# Patient Record
Sex: FEMALE | Race: WHITE | NOT HISPANIC OR LATINO | URBAN - METROPOLITAN AREA
[De-identification: names, ages, dates, MRNs, and addresses within clinical notes are randomized per-mention and may not be internally consistent; named-entity substitution may affect disease eponyms.]

---

## 2017-05-22 ENCOUNTER — OUTPATIENT (OUTPATIENT)
Dept: OUTPATIENT SERVICES | Facility: HOSPITAL | Age: 47
LOS: 1 days | End: 2017-05-22
Payer: COMMERCIAL

## 2017-05-22 ENCOUNTER — APPOINTMENT (OUTPATIENT)
Dept: ULTRASOUND IMAGING | Facility: HOSPITAL | Age: 47
End: 2017-05-22

## 2017-05-22 PROCEDURE — 76830 TRANSVAGINAL US NON-OB: CPT | Mod: 26

## 2017-05-22 PROCEDURE — 76856 US EXAM PELVIC COMPLETE: CPT

## 2017-05-22 PROCEDURE — 76830 TRANSVAGINAL US NON-OB: CPT

## 2017-05-22 PROCEDURE — 76856 US EXAM PELVIC COMPLETE: CPT | Mod: 26

## 2017-08-03 ENCOUNTER — APPOINTMENT (OUTPATIENT)
Dept: ULTRASOUND IMAGING | Facility: HOSPITAL | Age: 47
End: 2017-08-03

## 2017-08-03 ENCOUNTER — APPOINTMENT (OUTPATIENT)
Dept: MAMMOGRAPHY | Facility: HOSPITAL | Age: 47
End: 2017-08-03

## 2017-08-15 ENCOUNTER — APPOINTMENT (OUTPATIENT)
Dept: MAMMOGRAPHY | Facility: CLINIC | Age: 47
End: 2017-08-15
Payer: COMMERCIAL

## 2017-08-15 ENCOUNTER — OUTPATIENT (OUTPATIENT)
Dept: OUTPATIENT SERVICES | Facility: HOSPITAL | Age: 47
LOS: 1 days | End: 2017-08-15
Payer: COMMERCIAL

## 2017-08-15 ENCOUNTER — APPOINTMENT (OUTPATIENT)
Dept: ULTRASOUND IMAGING | Facility: CLINIC | Age: 47
End: 2017-08-15
Payer: COMMERCIAL

## 2017-08-15 DIAGNOSIS — Z00.8 ENCOUNTER FOR OTHER GENERAL EXAMINATION: ICD-10-CM

## 2017-08-15 PROCEDURE — 77067 SCR MAMMO BI INCL CAD: CPT

## 2017-08-15 PROCEDURE — G0202: CPT | Mod: 26

## 2017-08-15 PROCEDURE — 76831 ECHO EXAM UTERUS: CPT | Mod: 26

## 2017-08-15 PROCEDURE — 58340 CATHETER FOR HYSTEROGRAPHY: CPT

## 2017-08-15 PROCEDURE — 77063 BREAST TOMOSYNTHESIS BI: CPT | Mod: 26

## 2017-08-15 PROCEDURE — 76641 ULTRASOUND BREAST COMPLETE: CPT | Mod: 26,50

## 2017-08-15 PROCEDURE — 76831 ECHO EXAM UTERUS: CPT

## 2017-08-15 PROCEDURE — 77063 BREAST TOMOSYNTHESIS BI: CPT

## 2017-08-15 PROCEDURE — 76641 ULTRASOUND BREAST COMPLETE: CPT

## 2017-11-03 ENCOUNTER — APPOINTMENT (OUTPATIENT)
Dept: OBGYN | Facility: CLINIC | Age: 47
End: 2017-11-03
Payer: COMMERCIAL

## 2017-11-03 VITALS
HEART RATE: 54 BPM | DIASTOLIC BLOOD PRESSURE: 64 MMHG | HEIGHT: 69 IN | BODY MASS INDEX: 22.22 KG/M2 | WEIGHT: 150 LBS | SYSTOLIC BLOOD PRESSURE: 90 MMHG | OXYGEN SATURATION: 99 %

## 2017-11-03 PROCEDURE — 99203 OFFICE O/P NEW LOW 30 MIN: CPT | Mod: 25

## 2017-11-03 PROCEDURE — 57456 ENDOCERV CURETTAGE W/SCOPE: CPT

## 2017-11-03 RX ORDER — LEVOTHYROXINE SODIUM 175 UG/1
175 TABLET ORAL
Qty: 30 | Refills: 0 | Status: ACTIVE | COMMUNITY
Start: 2017-05-17

## 2017-11-03 RX ORDER — KETOCONAZOLE 20 MG/G
2 CREAM TOPICAL
Qty: 30 | Refills: 0 | Status: DISCONTINUED | COMMUNITY
Start: 2017-07-19 | End: 2017-11-03

## 2017-11-04 LAB — HPV HIGH+LOW RISK DNA PNL CVX: NOT DETECTED

## 2017-11-07 ENCOUNTER — APPOINTMENT (OUTPATIENT)
Dept: OBGYN | Facility: CLINIC | Age: 47
End: 2017-11-07
Payer: COMMERCIAL

## 2017-11-07 PROCEDURE — 99243 OFF/OP CNSLTJ NEW/EST LOW 30: CPT

## 2017-11-09 LAB
CORE LAB BIOPSY: NORMAL
CYTOLOGY CVX/VAG DOC THIN PREP: NORMAL

## 2017-11-13 ENCOUNTER — OTHER (OUTPATIENT)
Age: 47
End: 2017-11-13

## 2017-11-27 ENCOUNTER — OUTPATIENT (OUTPATIENT)
Dept: OUTPATIENT SERVICES | Facility: HOSPITAL | Age: 47
LOS: 1 days | End: 2017-11-27
Payer: COMMERCIAL

## 2017-11-27 PROCEDURE — 80048 BASIC METABOLIC PNL TOTAL CA: CPT

## 2017-11-27 PROCEDURE — 85027 COMPLETE CBC AUTOMATED: CPT

## 2017-11-27 PROCEDURE — G0463: CPT

## 2017-11-27 PROCEDURE — 36415 COLL VENOUS BLD VENIPUNCTURE: CPT

## 2017-11-27 PROCEDURE — 84703 CHORIONIC GONADOTROPIN ASSAY: CPT

## 2017-11-29 DIAGNOSIS — N84.0 POLYP OF CORPUS UTERI: ICD-10-CM

## 2017-11-29 DIAGNOSIS — Z01.818 ENCOUNTER FOR OTHER PREPROCEDURAL EXAMINATION: ICD-10-CM

## 2017-12-04 ENCOUNTER — OUTPATIENT (OUTPATIENT)
Dept: OUTPATIENT SERVICES | Facility: HOSPITAL | Age: 47
LOS: 1 days | End: 2017-12-04
Payer: COMMERCIAL

## 2017-12-04 ENCOUNTER — APPOINTMENT (OUTPATIENT)
Dept: OBGYN | Facility: HOSPITAL | Age: 47
End: 2017-12-04

## 2017-12-04 ENCOUNTER — TRANSCRIPTION ENCOUNTER (OUTPATIENT)
Age: 47
End: 2017-12-04

## 2017-12-04 ENCOUNTER — RESULT REVIEW (OUTPATIENT)
Age: 47
End: 2017-12-04

## 2017-12-04 DIAGNOSIS — N84.0 POLYP OF CORPUS UTERI: ICD-10-CM

## 2017-12-04 PROCEDURE — 58558 HYSTEROSCOPY BIOPSY: CPT

## 2017-12-04 PROCEDURE — 88305 TISSUE EXAM BY PATHOLOGIST: CPT | Mod: 26

## 2017-12-04 PROCEDURE — 88305 TISSUE EXAM BY PATHOLOGIST: CPT

## 2017-12-05 ENCOUNTER — OTHER (OUTPATIENT)
Age: 47
End: 2017-12-05

## 2018-01-31 ENCOUNTER — APPOINTMENT (OUTPATIENT)
Dept: OBGYN | Facility: CLINIC | Age: 48
End: 2018-01-31

## 2018-02-16 ENCOUNTER — APPOINTMENT (OUTPATIENT)
Dept: OBGYN | Facility: CLINIC | Age: 48
End: 2018-02-16
Payer: COMMERCIAL

## 2018-02-16 VITALS
SYSTOLIC BLOOD PRESSURE: 100 MMHG | DIASTOLIC BLOOD PRESSURE: 70 MMHG | BODY MASS INDEX: 22.66 KG/M2 | OXYGEN SATURATION: 98 % | HEIGHT: 69 IN | WEIGHT: 153 LBS | HEART RATE: 78 BPM | RESPIRATION RATE: 15 BRPM

## 2018-02-16 DIAGNOSIS — Z80.3 FAMILY HISTORY OF MALIGNANT NEOPLASM OF BREAST: ICD-10-CM

## 2018-02-16 DIAGNOSIS — N76.0 ACUTE VAGINITIS: ICD-10-CM

## 2018-02-16 PROCEDURE — 99214 OFFICE O/P EST MOD 30 MIN: CPT

## 2018-02-17 LAB
CANDIDA VAG CYTO: NOT DETECTED
G VAGINALIS+PREV SP MTYP VAG QL MICRO: NOT DETECTED
T VAGINALIS VAG QL WET PREP: NOT DETECTED

## 2018-02-20 ENCOUNTER — OTHER (OUTPATIENT)
Age: 48
End: 2018-02-20

## 2018-02-21 ENCOUNTER — OTHER (OUTPATIENT)
Age: 48
End: 2018-02-21

## 2018-02-28 ENCOUNTER — APPOINTMENT (OUTPATIENT)
Dept: ULTRASOUND IMAGING | Facility: HOSPITAL | Age: 48
End: 2018-02-28
Payer: COMMERCIAL

## 2018-02-28 ENCOUNTER — OUTPATIENT (OUTPATIENT)
Dept: OUTPATIENT SERVICES | Facility: HOSPITAL | Age: 48
LOS: 1 days | End: 2018-02-28
Payer: COMMERCIAL

## 2018-02-28 DIAGNOSIS — R92.8 OTHER ABNORMAL AND INCONCLUSIVE FINDINGS ON DIAGNOSTIC IMAGING OF BREAST: ICD-10-CM

## 2018-02-28 PROCEDURE — 76642 ULTRASOUND BREAST LIMITED: CPT | Mod: 26,LT

## 2018-02-28 PROCEDURE — 76642 ULTRASOUND BREAST LIMITED: CPT

## 2018-07-25 ENCOUNTER — APPOINTMENT (OUTPATIENT)
Dept: DERMATOLOGY | Facility: CLINIC | Age: 48
End: 2018-07-25

## 2018-08-29 ENCOUNTER — APPOINTMENT (OUTPATIENT)
Dept: DERMATOLOGY | Facility: CLINIC | Age: 48
End: 2018-08-29
Payer: MEDICAID

## 2018-08-29 VITALS
WEIGHT: 148 LBS | SYSTOLIC BLOOD PRESSURE: 114 MMHG | BODY MASS INDEX: 22.43 KG/M2 | DIASTOLIC BLOOD PRESSURE: 72 MMHG | HEIGHT: 68 IN

## 2018-08-29 DIAGNOSIS — L82.1 OTHER SEBORRHEIC KERATOSIS: ICD-10-CM

## 2018-08-29 DIAGNOSIS — D22.9 MELANOCYTIC NEVI, UNSPECIFIED: ICD-10-CM

## 2018-08-29 PROCEDURE — 99203 OFFICE O/P NEW LOW 30 MIN: CPT

## 2018-09-13 ENCOUNTER — OTHER (OUTPATIENT)
Age: 48
End: 2018-09-13

## 2018-09-26 ENCOUNTER — APPOINTMENT (OUTPATIENT)
Dept: DERMATOLOGY | Facility: CLINIC | Age: 48
End: 2018-09-26

## 2018-10-31 ENCOUNTER — APPOINTMENT (OUTPATIENT)
Dept: MAMMOGRAPHY | Facility: HOSPITAL | Age: 48
End: 2018-10-31
Payer: MEDICAID

## 2018-10-31 ENCOUNTER — OUTPATIENT (OUTPATIENT)
Dept: OUTPATIENT SERVICES | Facility: HOSPITAL | Age: 48
LOS: 1 days | End: 2018-10-31
Payer: MEDICAID

## 2018-10-31 ENCOUNTER — APPOINTMENT (OUTPATIENT)
Dept: ULTRASOUND IMAGING | Facility: HOSPITAL | Age: 48
End: 2018-10-31
Payer: MEDICAID

## 2018-10-31 DIAGNOSIS — Z00.8 ENCOUNTER FOR OTHER GENERAL EXAMINATION: ICD-10-CM

## 2018-10-31 PROCEDURE — 77066 DX MAMMO INCL CAD BI: CPT | Mod: 26

## 2018-10-31 PROCEDURE — G0279: CPT | Mod: 26

## 2018-10-31 PROCEDURE — 77062 BREAST TOMOSYNTHESIS BI: CPT | Mod: 26

## 2018-10-31 PROCEDURE — G0279: CPT

## 2018-10-31 PROCEDURE — 76641 ULTRASOUND BREAST COMPLETE: CPT

## 2018-10-31 PROCEDURE — 76641 ULTRASOUND BREAST COMPLETE: CPT | Mod: 26

## 2018-10-31 PROCEDURE — 77066 DX MAMMO INCL CAD BI: CPT

## 2018-11-05 ENCOUNTER — OTHER (OUTPATIENT)
Age: 48
End: 2018-11-05

## 2018-11-06 ENCOUNTER — OTHER (OUTPATIENT)
Age: 48
End: 2018-11-06

## 2018-11-07 ENCOUNTER — APPOINTMENT (OUTPATIENT)
Dept: DERMATOLOGY | Facility: CLINIC | Age: 48
End: 2018-11-07

## 2018-11-14 ENCOUNTER — RESULT REVIEW (OUTPATIENT)
Age: 48
End: 2018-11-14

## 2018-11-14 ENCOUNTER — OUTPATIENT (OUTPATIENT)
Dept: OUTPATIENT SERVICES | Facility: HOSPITAL | Age: 48
LOS: 1 days | End: 2018-11-14
Payer: MEDICAID

## 2018-11-14 DIAGNOSIS — N63.0 UNSPECIFIED LUMP IN UNSPECIFIED BREAST: ICD-10-CM

## 2018-11-14 DIAGNOSIS — R92.8 OTHER ABNORMAL AND INCONCLUSIVE FINDINGS ON DIAGNOSTIC IMAGING OF BREAST: ICD-10-CM

## 2018-11-14 PROCEDURE — 88305 TISSUE EXAM BY PATHOLOGIST: CPT

## 2018-11-14 PROCEDURE — 19083 BX BREAST 1ST LESION US IMAG: CPT

## 2018-11-14 PROCEDURE — 19000 PUNCTURE ASPIR CYST BREAST: CPT | Mod: RT

## 2018-11-14 PROCEDURE — 19084 BX BREAST ADD LESION US IMAG: CPT | Mod: RT

## 2018-11-14 PROCEDURE — 76942 ECHO GUIDE FOR BIOPSY: CPT | Mod: 26,76

## 2018-11-14 PROCEDURE — 88305 TISSUE EXAM BY PATHOLOGIST: CPT | Mod: 26

## 2018-11-14 PROCEDURE — 19000 PUNCTURE ASPIR CYST BREAST: CPT

## 2018-11-14 PROCEDURE — 88173 CYTOPATH EVAL FNA REPORT: CPT

## 2018-11-14 PROCEDURE — 19083 BX BREAST 1ST LESION US IMAG: CPT | Mod: RT

## 2018-11-14 PROCEDURE — 19084 BX BREAST ADD LESION US IMAG: CPT

## 2018-11-14 PROCEDURE — 88173 CYTOPATH EVAL FNA REPORT: CPT | Mod: 26

## 2018-11-14 PROCEDURE — A4648: CPT

## 2018-11-14 PROCEDURE — 76942 ECHO GUIDE FOR BIOPSY: CPT

## 2018-11-15 LAB
NON-GYNECOLOGICAL CYTOLOGY STUDY: SIGNIFICANT CHANGE UP
NON-GYNECOLOGICAL CYTOLOGY STUDY: SIGNIFICANT CHANGE UP

## 2018-11-16 LAB — SURGICAL PATHOLOGY STUDY: SIGNIFICANT CHANGE UP

## 2018-11-21 ENCOUNTER — RESULT REVIEW (OUTPATIENT)
Age: 48
End: 2018-11-21

## 2018-11-27 ENCOUNTER — RESULT REVIEW (OUTPATIENT)
Age: 48
End: 2018-11-27

## 2019-02-06 ENCOUNTER — APPOINTMENT (OUTPATIENT)
Dept: DERMATOLOGY | Facility: CLINIC | Age: 49
End: 2019-02-06

## 2019-07-03 ENCOUNTER — CHART COPY (OUTPATIENT)
Age: 49
End: 2019-07-03

## 2019-09-19 ENCOUNTER — APPOINTMENT (OUTPATIENT)
Dept: SURGICAL ONCOLOGY | Facility: CLINIC | Age: 49
End: 2019-09-19
Payer: MEDICAID

## 2019-09-19 VITALS
BODY MASS INDEX: 22.13 KG/M2 | OXYGEN SATURATION: 99 % | RESPIRATION RATE: 14 BRPM | HEIGHT: 68 IN | SYSTOLIC BLOOD PRESSURE: 110 MMHG | DIASTOLIC BLOOD PRESSURE: 74 MMHG | HEART RATE: 68 BPM | WEIGHT: 146 LBS

## 2019-09-19 DIAGNOSIS — Z87.19 PERSONAL HISTORY OF OTHER DISEASES OF THE DIGESTIVE SYSTEM: ICD-10-CM

## 2019-09-19 PROCEDURE — 99205 OFFICE O/P NEW HI 60 MIN: CPT

## 2019-09-19 NOTE — CONSULT LETTER
[Consult Letter:] : I had the pleasure of evaluating your patient, [unfilled]. [Please see my note below.] : Please see my note below. [Sincerely,] : Sincerely, [Dear  ___] : Dear  [unfilled], [FreeTextEntry3] : Kevin Valente MD FACS\par  [DrSarah  ___] : Dr. POPE

## 2019-09-19 NOTE — ADDENDUM
[FreeTextEntry1] : I, Gila Rowan, acted solely as a scribe for Dr. Kevin Valente on this date 09/19/2019.\par

## 2019-09-19 NOTE — PHYSICAL EXAM
[Normal] : supple, no neck mass and thyroid not enlarged [Normal Neck Lymph Nodes] : normal neck lymph nodes  [Normal Supraclavicular Lymph Nodes] : normal supraclavicular lymph nodes [Normal Groin Lymph Nodes] : normal groin lymph nodes [Normal Axillary Lymph Nodes] : normal axillary lymph nodes [Normal] : oriented to person, place and time, with appropriate affect [de-identified] : 3mm scab with larger surrounding area of scar dorsal aspect left forearm at site of recent biopsy. Multiple other benign-appearing pigmented lesions head, neck, trunk, and extremities.

## 2019-09-19 NOTE — ASSESSMENT
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 \par

## 2019-09-19 NOTE — HISTORY OF PRESENT ILLNESS
[de-identified] : 50 y/o female presents for initial consultation for melanoma in situ.\par \par She is s/p shave biopsy of left forearm (9/4/19): atypical intraepidermal melanocytic proliferation. Melanocytic proliferation is difficult to interpret, however, there are features that suggest it likely represents an early stage in the evolution of a malignant melanoma in situ.\par \par SHx: Thyroidectomy, thymectomy, uterine polyp removal\par MHx: Metastatic thyroid cancer - recent TG level decreased\par Denies constitutional symptoms. \par Denies fever or chills.

## 2019-09-27 ENCOUNTER — APPOINTMENT (OUTPATIENT)
Dept: OBGYN | Facility: CLINIC | Age: 49
End: 2019-09-27
Payer: MEDICAID

## 2019-09-27 VITALS
OXYGEN SATURATION: 98 % | HEART RATE: 73 BPM | BODY MASS INDEX: 21.98 KG/M2 | HEIGHT: 68 IN | SYSTOLIC BLOOD PRESSURE: 110 MMHG | DIASTOLIC BLOOD PRESSURE: 70 MMHG | WEIGHT: 145 LBS

## 2019-09-27 DIAGNOSIS — R92.2 INCONCLUSIVE MAMMOGRAM: ICD-10-CM

## 2019-09-27 DIAGNOSIS — Z13.820 ENCOUNTER FOR SCREENING FOR OSTEOPOROSIS: ICD-10-CM

## 2019-09-27 DIAGNOSIS — C18.9 MALIGNANT NEOPLASM OF COLON, UNSPECIFIED: ICD-10-CM

## 2019-09-27 PROCEDURE — 99396 PREV VISIT EST AGE 40-64: CPT

## 2019-09-27 RX ORDER — MISOPROSTOL 200 UG/1
200 TABLET ORAL
Qty: 4 | Refills: 0 | Status: DISCONTINUED | COMMUNITY
Start: 2017-11-03 | End: 2019-09-27

## 2019-09-27 RX ORDER — LEVOTHYROXINE SODIUM 137 UG/1
TABLET ORAL
Refills: 0 | Status: DISCONTINUED | COMMUNITY
End: 2019-09-27

## 2019-09-27 NOTE — PHYSICAL EXAM
[Awake] : awake [Alert] : alert [Acute Distress] : no acute distress [Mass] : no breast mass [Nipple Discharge] : no nipple discharge [Axillary LAD] : no axillary lymphadenopathy [Soft] : soft [Oriented x3] : oriented to person, place, and time [Tender] : non tender [Normal] : uterus [No Bleeding] : there was no active vaginal bleeding [FreeTextEntry9] : rt buttock lesion --derm monitors [Uterine Adnexae] : were not tender and not enlarged

## 2019-09-27 NOTE — HISTORY OF PRESENT ILLNESS
[Good] : being in good health [Last Mammogram ___] : Last Mammogram was [unfilled] [Last Colonoscopy ___] : Last colonoscopy [unfilled] [Last Pap ___] : Last cervical pap smear was [unfilled] [Perimenopausal] : is perimenopausal [Sexually Active] : is sexually active [Monogamous (Female Partner)] : is monogamous with a female partner [HPV Vaccine NA Due to Age] : HPV vaccine not available to patient due to age

## 2019-09-29 LAB — HPV HIGH+LOW RISK DNA PNL CVX: NOT DETECTED

## 2019-10-04 LAB — CYTOLOGY CVX/VAG DOC THIN PREP: NORMAL

## 2019-10-08 ENCOUNTER — RESULT REVIEW (OUTPATIENT)
Age: 49
End: 2019-10-08

## 2019-10-08 ENCOUNTER — OUTPATIENT (OUTPATIENT)
Dept: OUTPATIENT SERVICES | Facility: HOSPITAL | Age: 49
LOS: 1 days | End: 2019-10-08
Payer: MEDICAID

## 2019-10-08 DIAGNOSIS — D03.9 MELANOMA IN SITU, UNSPECIFIED: ICD-10-CM

## 2019-10-08 PROCEDURE — 88321 CONSLTJ&REPRT SLD PREP ELSWR: CPT

## 2019-10-10 ENCOUNTER — OUTPATIENT (OUTPATIENT)
Dept: OUTPATIENT SERVICES | Facility: HOSPITAL | Age: 49
LOS: 1 days | End: 2019-10-10
Payer: MEDICAID

## 2019-10-10 VITALS
DIASTOLIC BLOOD PRESSURE: 60 MMHG | OXYGEN SATURATION: 99 % | HEART RATE: 63 BPM | TEMPERATURE: 99 F | HEIGHT: 69 IN | WEIGHT: 146.83 LBS | SYSTOLIC BLOOD PRESSURE: 96 MMHG | RESPIRATION RATE: 16 BRPM

## 2019-10-10 DIAGNOSIS — E03.9 HYPOTHYROIDISM, UNSPECIFIED: ICD-10-CM

## 2019-10-10 DIAGNOSIS — Z98.890 OTHER SPECIFIED POSTPROCEDURAL STATES: Chronic | ICD-10-CM

## 2019-10-10 DIAGNOSIS — Z90.89 ACQUIRED ABSENCE OF OTHER ORGANS: Chronic | ICD-10-CM

## 2019-10-10 DIAGNOSIS — D03.9 MELANOMA IN SITU, UNSPECIFIED: ICD-10-CM

## 2019-10-10 DIAGNOSIS — E89.0 POSTPROCEDURAL HYPOTHYROIDISM: Chronic | ICD-10-CM

## 2019-10-10 DIAGNOSIS — C43.9 MALIGNANT MELANOMA OF SKIN, UNSPECIFIED: ICD-10-CM

## 2019-10-10 LAB
HCT VFR BLD CALC: 35.3 % — SIGNIFICANT CHANGE UP (ref 34.5–45)
HGB BLD-MCNC: 11.8 G/DL — SIGNIFICANT CHANGE UP (ref 11.5–15.5)
MCHC RBC-ENTMCNC: 30.3 PG — SIGNIFICANT CHANGE UP (ref 27–34)
MCHC RBC-ENTMCNC: 33.4 GM/DL — SIGNIFICANT CHANGE UP (ref 32–36)
MCV RBC AUTO: 90.7 FL — SIGNIFICANT CHANGE UP (ref 80–100)
NRBC # BLD: 0 /100 WBCS — SIGNIFICANT CHANGE UP (ref 0–0)
PLATELET # BLD AUTO: 218 K/UL — SIGNIFICANT CHANGE UP (ref 150–400)
RBC # BLD: 3.89 M/UL — SIGNIFICANT CHANGE UP (ref 3.8–5.2)
RBC # FLD: 12.2 % — SIGNIFICANT CHANGE UP (ref 10.3–14.5)
SURGICAL PATHOLOGY STUDY: SIGNIFICANT CHANGE UP
WBC # BLD: 4.73 K/UL — SIGNIFICANT CHANGE UP (ref 3.8–10.5)
WBC # FLD AUTO: 4.73 K/UL — SIGNIFICANT CHANGE UP (ref 3.8–10.5)

## 2019-10-10 PROCEDURE — 85027 COMPLETE CBC AUTOMATED: CPT

## 2019-10-10 PROCEDURE — G0463: CPT

## 2019-10-10 PROCEDURE — 36415 COLL VENOUS BLD VENIPUNCTURE: CPT

## 2019-10-10 NOTE — H&P PST ADULT - NSICDXPROBLEM_GEN_ALL_CORE_FT
PROBLEM DIAGNOSES  Problem: Melanoma  Assessment and Plan: Wide Excision Left Forearm Melanoma Insitu scheduled for 10/22/2019  pre-op instructions given. Pt verbalized understanding  Pepcid & Chlorhexidine wash instructions given  UCG ordered STAT for day of procedure    Problem: Hypothyroid  Assessment and Plan: Pt instructed to take meds as prescribed

## 2019-10-10 NOTE — H&P PST ADULT - MUSCULOSKELETAL
negative detailed exam no joint warmth/normal strength/no joint erythema/no calf tenderness/no joint swelling/ROM intact

## 2019-10-10 NOTE — H&P PST ADULT - NSANTHOSAYNRD_GEN_A_CORE
neck 13inches/No. TONY screening performed.  STOP BANG Legend: 0-2 = LOW Risk; 3-4 = INTERMEDIATE Risk; 5-8 = HIGH Risk

## 2019-10-10 NOTE — H&P PST ADULT - HISTORY OF PRESENT ILLNESS
50yo female with medical h/o Hypothyroid reports she newly diagnosed melanoma to left forearm. Pt presents today for PST for Wide Excision Left Forearm Melanoma Insitu scheduled for 10/22/2019 50yo female with medical h/o Hypothyroid reports being newly diagnosed with melanoma to left forearm. Pt presents today for PST for Wide Excision Left Forearm Melanoma Insitu scheduled for 10/22/2019

## 2019-10-21 ENCOUNTER — TRANSCRIPTION ENCOUNTER (OUTPATIENT)
Age: 49
End: 2019-10-21

## 2019-10-22 ENCOUNTER — RESULT REVIEW (OUTPATIENT)
Age: 49
End: 2019-10-22

## 2019-10-22 ENCOUNTER — CHART COPY (OUTPATIENT)
Age: 49
End: 2019-10-22

## 2019-10-22 ENCOUNTER — APPOINTMENT (OUTPATIENT)
Dept: SURGICAL ONCOLOGY | Facility: HOSPITAL | Age: 49
End: 2019-10-22

## 2019-10-22 ENCOUNTER — OUTPATIENT (OUTPATIENT)
Dept: OUTPATIENT SERVICES | Facility: HOSPITAL | Age: 49
LOS: 1 days | End: 2019-10-22
Payer: MEDICAID

## 2019-10-22 VITALS
WEIGHT: 146.83 LBS | TEMPERATURE: 98 F | HEART RATE: 76 BPM | DIASTOLIC BLOOD PRESSURE: 61 MMHG | HEIGHT: 69 IN | SYSTOLIC BLOOD PRESSURE: 100 MMHG | OXYGEN SATURATION: 97 % | RESPIRATION RATE: 16 BRPM

## 2019-10-22 VITALS
TEMPERATURE: 98 F | SYSTOLIC BLOOD PRESSURE: 92 MMHG | HEART RATE: 73 BPM | DIASTOLIC BLOOD PRESSURE: 54 MMHG | OXYGEN SATURATION: 98 % | RESPIRATION RATE: 16 BRPM

## 2019-10-22 DIAGNOSIS — Z98.890 OTHER SPECIFIED POSTPROCEDURAL STATES: Chronic | ICD-10-CM

## 2019-10-22 DIAGNOSIS — Z90.89 ACQUIRED ABSENCE OF OTHER ORGANS: Chronic | ICD-10-CM

## 2019-10-22 DIAGNOSIS — E89.0 POSTPROCEDURAL HYPOTHYROIDISM: Chronic | ICD-10-CM

## 2019-10-22 DIAGNOSIS — D03.9 MELANOMA IN SITU, UNSPECIFIED: ICD-10-CM

## 2019-10-22 PROCEDURE — 88342 IMHCHEM/IMCYTCHM 1ST ANTB: CPT | Mod: 26

## 2019-10-22 PROCEDURE — 14001 TIS TRNFR TRUNK 10.1-30SQCM: CPT | Mod: 59

## 2019-10-22 PROCEDURE — 88341 IMHCHEM/IMCYTCHM EA ADD ANTB: CPT | Mod: 26

## 2019-10-22 PROCEDURE — 25078 RESECT FORARM/WRIST TUM 3CM>: CPT | Mod: AS

## 2019-10-22 PROCEDURE — 11604 EXC TR-EXT MAL+MARG 3.1-4 CM: CPT

## 2019-10-22 PROCEDURE — 88341 IMHCHEM/IMCYTCHM EA ADD ANTB: CPT

## 2019-10-22 PROCEDURE — 25078 RESECT FORARM/WRIST TUM 3CM>: CPT

## 2019-10-22 PROCEDURE — 88305 TISSUE EXAM BY PATHOLOGIST: CPT

## 2019-10-22 PROCEDURE — 88305 TISSUE EXAM BY PATHOLOGIST: CPT | Mod: 26

## 2019-10-22 PROCEDURE — 88342 IMHCHEM/IMCYTCHM 1ST ANTB: CPT

## 2019-10-22 RX ORDER — OXYCODONE HYDROCHLORIDE 5 MG/1
5 TABLET ORAL ONCE
Refills: 0 | Status: DISCONTINUED | OUTPATIENT
Start: 2019-10-22 | End: 2019-10-22

## 2019-10-22 RX ORDER — ONDANSETRON 8 MG/1
4 TABLET, FILM COATED ORAL ONCE
Refills: 0 | Status: DISCONTINUED | OUTPATIENT
Start: 2019-10-22 | End: 2019-10-22

## 2019-10-22 RX ORDER — IBUPROFEN 200 MG
3 TABLET ORAL
Qty: 0 | Refills: 0 | DISCHARGE

## 2019-10-22 RX ORDER — SODIUM CHLORIDE 9 MG/ML
1000 INJECTION, SOLUTION INTRAVENOUS
Refills: 0 | Status: DISCONTINUED | OUTPATIENT
Start: 2019-10-22 | End: 2019-11-09

## 2019-10-22 RX ORDER — LEVOTHYROXINE SODIUM 125 MCG
1 TABLET ORAL
Qty: 0 | Refills: 0 | DISCHARGE

## 2019-10-22 RX ORDER — OXYCODONE AND ACETAMINOPHEN 5; 325 MG/1; MG/1
1 TABLET ORAL EVERY 4 HOURS
Refills: 0 | Status: DISCONTINUED | OUTPATIENT
Start: 2019-10-22 | End: 2019-10-22

## 2019-10-22 RX ORDER — PREGABALIN 225 MG/1
1 CAPSULE ORAL
Qty: 0 | Refills: 0 | DISCHARGE

## 2019-10-22 RX ORDER — HYDROMORPHONE HYDROCHLORIDE 2 MG/ML
0.5 INJECTION INTRAMUSCULAR; INTRAVENOUS; SUBCUTANEOUS
Refills: 0 | Status: DISCONTINUED | OUTPATIENT
Start: 2019-10-22 | End: 2019-10-22

## 2019-10-22 RX ORDER — BENZOYL PEROXIDE MICRONIZED 5.8 %
1 TOWELETTE (EA) TOPICAL
Qty: 0 | Refills: 0 | DISCHARGE

## 2019-10-22 RX ORDER — SODIUM CHLORIDE 9 MG/ML
1000 INJECTION, SOLUTION INTRAVENOUS
Refills: 0 | Status: DISCONTINUED | OUTPATIENT
Start: 2019-10-22 | End: 2019-10-22

## 2019-10-22 RX ORDER — OXYCODONE AND ACETAMINOPHEN 5; 325 MG/1; MG/1
2 TABLET ORAL EVERY 6 HOURS
Refills: 0 | Status: DISCONTINUED | OUTPATIENT
Start: 2019-10-22 | End: 2019-10-22

## 2019-10-22 RX ADMIN — SODIUM CHLORIDE 50 MILLILITER(S): 9 INJECTION, SOLUTION INTRAVENOUS at 10:03

## 2019-10-22 NOTE — BRIEF OPERATIVE NOTE - NSICDXBRIEFPROCEDURE_GEN_ALL_CORE_FT
PROCEDURES:  Wide excision, melanoma, upper extremity, with sentinel lymph node biopsy 22-Oct-2019 12:45:17 no sentinel node biopsy of left forearm melanoma Yanna Brunson

## 2019-10-22 NOTE — ASU PATIENT PROFILE, ADULT - PSH
H/O ovarian cystectomy  2018  S/P lumpectomy, right breast  benign mass  S/P thymectomy  2018  S/P total thyroidectomy H/O ovarian cystectomy  2018  S/P lumpectomy, right breast  benign mass  S/P thymectomy  2018 partial removal  S/P total thyroidectomy

## 2019-10-22 NOTE — ASU DISCHARGE PLAN (ADULT/PEDIATRIC) - ASU DC SPECIAL INSTRUCTIONSFT
discharge home from asu when meets criteria   follow up with Dr Valente next week  take pain medications as prescribed   may shower over steri strips in 1 day , do not take steri strips off

## 2019-10-22 NOTE — ASU DISCHARGE PLAN (ADULT/PEDIATRIC) - CALL YOUR DOCTOR IF YOU HAVE ANY OF THE FOLLOWING:
Bleeding that does not stop/Swelling that gets worse Fever greater than (need to indicate Fahrenheit or Celsius)/Swelling that gets worse/Bleeding that does not stop

## 2019-10-22 NOTE — ASU PATIENT PROFILE, ADULT - VISION (WITH CORRECTIVE LENSES IF THE PATIENT USUALLY WEARS THEM):
Normal vision: sees adequately in most situations; can see medication labels, newsprint Normal vision: sees adequately in most situations; can see medication labels, newsprint/reading and distance glasses

## 2019-10-22 NOTE — ASU DISCHARGE PLAN (ADULT/PEDIATRIC) - CARE PROVIDER_API CALL
Kevin Valente)  Surgery  [address] [place] [address]  Phone: [phone]  Fax: [phone]  Follow Up Time:

## 2019-10-25 PROBLEM — C73 MALIGNANT NEOPLASM OF THYROID GLAND: Chronic | Status: ACTIVE | Noted: 2019-10-10

## 2019-10-25 PROBLEM — E03.9 HYPOTHYROIDISM, UNSPECIFIED: Chronic | Status: ACTIVE | Noted: 2019-10-10

## 2019-11-06 ENCOUNTER — APPOINTMENT (OUTPATIENT)
Dept: SURGICAL ONCOLOGY | Facility: CLINIC | Age: 49
End: 2019-11-06
Payer: MEDICAID

## 2019-11-06 VITALS
OXYGEN SATURATION: 99 % | HEART RATE: 73 BPM | DIASTOLIC BLOOD PRESSURE: 64 MMHG | BODY MASS INDEX: 21.98 KG/M2 | SYSTOLIC BLOOD PRESSURE: 100 MMHG | RESPIRATION RATE: 14 BRPM | WEIGHT: 145 LBS | HEIGHT: 68 IN

## 2019-11-06 PROCEDURE — 99024 POSTOP FOLLOW-UP VISIT: CPT

## 2019-11-06 NOTE — ADDENDUM
[FreeTextEntry1] : I, Gila Rowan, acted solely as a scribe for Dr. Kevin Valente on this date 11/06/2019.

## 2019-11-06 NOTE — ASSESSMENT
[FreeTextEntry1] : 2 weeks s/p wide excision left forearm melanoma in situ\par Final pathology still pending\par Will follow up with patient when results are available\par RTO 6 months\par Continue dermatologic surveillance\par \par Enclosed pathology report.

## 2019-11-06 NOTE — HISTORY OF PRESENT ILLNESS
[de-identified] : 50 y/o female presents for post-op follow-up s/p wide excision of left forearm melanoma in situ on 10/22/19. Pathology pending.\par \par She is s/p shave biopsy of left forearm (9/4/19): atypical intraepidermal melanocytic proliferation. Melanocytic proliferation is difficult to interpret, however, there are features that suggest it likely represents an early stage in the evolution of a malignant melanoma in situ.\par \par SHx: Thyroidectomy, thymectomy, uterine polyp removal\par MHx: Metastatic thyroid cancer - recent TG level decreased\par Denies constitutional symptoms. \par Denies fever or chills.

## 2019-11-06 NOTE — CONSULT LETTER
[Dear  ___] : Dear  [unfilled], [Courtesy Letter:] : I had the pleasure of seeing your patient, [unfilled], in my office today. [Please see my note below.] : Please see my note below. [Sincerely,] : Sincerely, [DrSarah  ___] : Dr. POPE [FreeTextEntry3] : Kevin Valente MD FACS\par

## 2019-11-07 LAB — SURGICAL PATHOLOGY STUDY: SIGNIFICANT CHANGE UP

## 2020-01-01 ENCOUNTER — FORM ENCOUNTER (OUTPATIENT)
Age: 50
End: 2020-01-01

## 2020-01-02 ENCOUNTER — APPOINTMENT (OUTPATIENT)
Dept: ULTRASOUND IMAGING | Facility: CLINIC | Age: 50
End: 2020-01-02
Payer: MEDICAID

## 2020-01-02 ENCOUNTER — APPOINTMENT (OUTPATIENT)
Dept: RADIOLOGY | Facility: CLINIC | Age: 50
End: 2020-01-02
Payer: MEDICAID

## 2020-01-02 ENCOUNTER — APPOINTMENT (OUTPATIENT)
Dept: MAMMOGRAPHY | Facility: CLINIC | Age: 50
End: 2020-01-02
Payer: MEDICAID

## 2020-01-02 ENCOUNTER — OUTPATIENT (OUTPATIENT)
Dept: OUTPATIENT SERVICES | Facility: HOSPITAL | Age: 50
LOS: 1 days | End: 2020-01-02
Payer: MEDICAID

## 2020-01-02 DIAGNOSIS — Z98.890 OTHER SPECIFIED POSTPROCEDURAL STATES: Chronic | ICD-10-CM

## 2020-01-02 DIAGNOSIS — E89.0 POSTPROCEDURAL HYPOTHYROIDISM: Chronic | ICD-10-CM

## 2020-01-02 DIAGNOSIS — Z90.89 ACQUIRED ABSENCE OF OTHER ORGANS: Chronic | ICD-10-CM

## 2020-01-02 DIAGNOSIS — Z00.8 ENCOUNTER FOR OTHER GENERAL EXAMINATION: ICD-10-CM

## 2020-01-02 PROCEDURE — 77062 BREAST TOMOSYNTHESIS BI: CPT | Mod: 26

## 2020-01-02 PROCEDURE — 76641 ULTRASOUND BREAST COMPLETE: CPT | Mod: 26,50

## 2020-01-02 PROCEDURE — 77066 DX MAMMO INCL CAD BI: CPT

## 2020-01-02 PROCEDURE — 77080 DXA BONE DENSITY AXIAL: CPT

## 2020-01-02 PROCEDURE — 77080 DXA BONE DENSITY AXIAL: CPT | Mod: 26

## 2020-01-02 PROCEDURE — G0279: CPT

## 2020-01-02 PROCEDURE — 77066 DX MAMMO INCL CAD BI: CPT | Mod: 26

## 2020-01-02 PROCEDURE — 76641 ULTRASOUND BREAST COMPLETE: CPT

## 2020-01-08 ENCOUNTER — OTHER (OUTPATIENT)
Age: 50
End: 2020-01-08

## 2020-01-30 ENCOUNTER — APPOINTMENT (OUTPATIENT)
Dept: OBGYN | Facility: CLINIC | Age: 50
End: 2020-01-30
Payer: MEDICAID

## 2020-01-30 VITALS
OXYGEN SATURATION: 99 % | WEIGHT: 147 LBS | HEIGHT: 68 IN | BODY MASS INDEX: 22.28 KG/M2 | HEART RATE: 66 BPM | SYSTOLIC BLOOD PRESSURE: 114 MMHG | DIASTOLIC BLOOD PRESSURE: 72 MMHG

## 2020-01-30 PROCEDURE — 99213 OFFICE O/P EST LOW 20 MIN: CPT

## 2020-01-30 NOTE — CHIEF COMPLAINT
[Urgent Visit] : Urgent Visit [FreeTextEntry1] : C/O Lt breast pain for past 2 - 3 months  S/P mammo/ breast sono 1/2/2020 WNL Recently took\par Amoxicillin X 10 days for ear infection

## 2020-01-30 NOTE — PHYSICAL EXAM
[Examination Of The Breasts] : a normal appearance [Normal] : normal [No Discharge] : no discharge [Tenderness Of The Right Breast] : no tenderness [No Masses] : no breast masses were palpable [Tenderness Of The Left Breast] : tenderness [Axillary Lymph Nodes Enlarged Bilaterally] : no enlarged nodes

## 2020-02-12 ENCOUNTER — RESULT REVIEW (OUTPATIENT)
Age: 50
End: 2020-02-12

## 2020-04-08 ENCOUNTER — APPOINTMENT (OUTPATIENT)
Dept: SURGICAL ONCOLOGY | Facility: CLINIC | Age: 50
End: 2020-04-08

## 2020-04-10 ENCOUNTER — APPOINTMENT (OUTPATIENT)
Dept: SURGICAL ONCOLOGY | Facility: CLINIC | Age: 50
End: 2020-04-10
Payer: MEDICAID

## 2020-04-10 DIAGNOSIS — D03.9 MELANOMA IN SITU, UNSPECIFIED: ICD-10-CM

## 2020-04-10 PROCEDURE — 99214 OFFICE O/P EST MOD 30 MIN: CPT | Mod: 95

## 2020-04-10 NOTE — HISTORY OF PRESENT ILLNESS
[de-identified] : 49 y/o female presents for a 6 month follow up Telemed visit - verbal consent obtained, PerspecSys system not working, visit performed using Dubizzle.\par She is s/p wide excision of left forearm melanoma in situ on 10/22/19. Final pathology revealed scar secondary to previous procedure with no residual neoplasm. \par Denies new skin lesions or masses. No bleeding or pruritic moles. Continues f/u with dermatology - Dr. Washburn - recently had biopsies left arm proximal to wide excision and on back reportedly negative.\par \par PERTINENT HISTORY:\par She is s/p shave biopsy of left forearm (9/4/19): atypical intraepidermal melanocytic proliferation. Melanocytic proliferation is difficult to interpret, however, there are features that suggest it likely represents an early stage in the evolution of a malignant melanoma in situ.\par \par SHx: Thyroidectomy, thymectomy, uterine polyp removal\par MHx: Metastatic thyroid cancer - recent TG level decreased\par Denies constitutional symptoms. \par Denies fever or chills.

## 2020-04-10 NOTE — ASSESSMENT
[FreeTextEntry1] : S/p wide excision of left forearm melanoma in situ on 10/22/19\par Final pathology revealed scar secondary to previous procedure with no residual neoplasm\par MARIS\par Continue dermatologic surveillance w Dr. Washburn\par RTO 6 months

## 2020-04-10 NOTE — PHYSICAL EXAM
[de-identified] : left forearm scar well healed, no evidence of recurrence, recent biopsy site superior to scar seen, no obvious suspicious skin lesions of left upper extremity

## 2020-12-16 PROBLEM — N76.0 ACUTE VAGINITIS: Status: RESOLVED | Noted: 2018-02-16 | Resolved: 2020-12-16

## 2020-12-29 ENCOUNTER — APPOINTMENT (OUTPATIENT)
Dept: SURGERY | Facility: CLINIC | Age: 50
End: 2020-12-29
Payer: MEDICAID

## 2020-12-29 DIAGNOSIS — Z12.39 ENCOUNTER FOR OTHER SCREENING FOR MALIGNANT NEOPLASM OF BREAST: ICD-10-CM

## 2020-12-29 PROCEDURE — 99072 ADDL SUPL MATRL&STAF TM PHE: CPT

## 2020-12-29 PROCEDURE — 99203 OFFICE O/P NEW LOW 30 MIN: CPT

## 2020-12-30 NOTE — ASSESSMENT
[FreeTextEntry1] : Patient with history of left breast pain, with no suspicious findings on physical examination her prior imaging. Patient is due for bilateral mammogram and ultrasound January 2021. The patient will obtain recommended studies and contact my office.\par If no suspicious findings, RTO 6 months.

## 2020-12-30 NOTE — HISTORY OF PRESENT ILLNESS
[FreeTextEntry1] : Patient referred by Dr. Ambrocio for evaluation of dense breasts with pain. Patient denies breast mass or nipple discharge. Prior biopsy benign.  Menstruation regular, denies HRT. Bilateral mammogram and ultrasound January 2020: Heterogeneous dense breast tissue, no evidence of malignancy. Multiple bilateral nodules subcentimeter, stable. Prior right breast biopsies benign. No abnormality noted in the left breast 6 o'clock region of pain. Patient's mother had breast cancer at 76 yo.  Patient has a history of recurrent papillary thyroid cancer, which he is followed at [facility] .

## 2020-12-30 NOTE — PHYSICAL EXAM
[Normocephalic] : normocephalic [EOMI] : extra ocular movement intact [Sclera nonicteric] : sclera nonicteric [Supple] : supple [No Supraclavicular Adenopathy] : no supraclavicular adenopathy [No Cervical Adenopathy] : no cervical adenopathy [Examined in the supine and seated position] : examined in the supine and seated position [Symmetrical] : symmetrical [No dominant masses] : no dominant masses in right breast  [No dominant masses] : no dominant masses left breast [No Nipple Retraction] : no left nipple retraction [No Nipple Discharge] : no left nipple discharge [No Axillary Lymphadenopathy] : no left axillary lymphadenopathy [Soft] : abdomen soft [No Swelling] : no swelling [Full ROM] : full range of motion [No Rashes] : no rashes [de-identified] : healed cervical incision, no masses

## 2020-12-30 NOTE — CONSULT LETTER
[Dear  ___] : Dear  [unfilled], [Consult Letter:] : I had the pleasure of evaluating your patient, [unfilled]. [Please see my note below.] : Please see my note below. [Consult Closing:] : Thank you very much for allowing me to participate in the care of this patient.  If you have any questions, please do not hesitate to contact me. [FreeTextEntry2] : Dr. Elvie Ambrocio [FreeTextEntry3] : Sincerely yours,\par \par Kaleigh Lester MD, FACS\par Assistant Professor of Surgery\par [facility]

## 2021-01-16 ENCOUNTER — TRANSCRIPTION ENCOUNTER (OUTPATIENT)
Age: 51
End: 2021-01-16

## 2021-01-20 NOTE — H&P PST ADULT - ATTENDING PHYSICIAN: I HAVE REVIEWED THE CLINICAL DOCUMENTATION AND AGREE WITH THE ABOVE NOTE
Follow up with radiology tomorrow for paracentesis. Call tomorrow oncology for follow up.    General Instructions:  You are considered stable for discharge from the emergency department. Please carefully follow all the instructions you were given verbally as well as the written instructions given below. In general, immediately return to the emergency department if the symptoms you presented with today increase in severity, change in any way, and/or do not improve in what you consider an acceptable time frame.  Return if you develop fever >101, vomiting, oral liquid intolerance, chest pain, shortness of breath, weakness, change in behavior, or any other concerns.    Medication(s) Instructions (if applicable):  If you were given any medication(s) upon discharge, please strictly follow the directions as prescribed for taking the medication(s). Should you feel you develop any type of reaction to the medication(s), including, but not limited to, rash, swelling of the lips or face, or difficulty breathing, immediately discontinue the use of the medication(s) and seek prompt medical care. Please read the medication(s) insert provided by the pharmacy and follow all guidelines and recommendations. Please take all medications as prescribed for your symptoms or diagnoses.                Follow-Up Instructions:  If you were given instructions to follow-up with a health care provider upon discharge, please be sure to do so.  It is your responsibility to call and/or make an appointment with the health care providers listed on your discharge papers and/or your primary care provider in the appropriate time frame given.  Please take a copy of your discharge papers with you to your follow-up appointment(s). Please follow up with your primary care physician in 3 days or earlier if needed for your symptoms. Please follow up with any specialist provider in clinic that your were given instructions to see as an outpatient.     YOU MUST  CALL THE NUMBER LISTED ON THE DISCHARGE PAPERWORK TO CONFIRM YOUR APPOINTMENT.    Special Information / Instructions:  Please read and follow all attached discharge instructions.      Please call the Emergency Department/Urgent care at which you were seen with any questions or concerns:    [facility] Emergency Department at [phone]   [facility] Emergency Department at [phone]  [facility] Emergency Department at [phone]  [facility] [phone]   [facility] [phone]    Please return to the Emergency Department if you have persistence or worsening of your symptoms.     Statement Selected

## 2021-01-27 ENCOUNTER — APPOINTMENT (OUTPATIENT)
Dept: OBGYN | Facility: CLINIC | Age: 51
End: 2021-01-27
Payer: COMMERCIAL

## 2021-02-04 ENCOUNTER — APPOINTMENT (OUTPATIENT)
Dept: OBGYN | Facility: CLINIC | Age: 51
End: 2021-02-04
Payer: COMMERCIAL

## 2021-02-04 VITALS
BODY MASS INDEX: 21.22 KG/M2 | WEIGHT: 140 LBS | HEIGHT: 68 IN | SYSTOLIC BLOOD PRESSURE: 110 MMHG | HEART RATE: 105 BPM | TEMPERATURE: 96.8 F | OXYGEN SATURATION: 97 % | DIASTOLIC BLOOD PRESSURE: 70 MMHG

## 2021-02-04 DIAGNOSIS — N84.0 POLYP OF CORPUS UTERI: ICD-10-CM

## 2021-02-04 PROCEDURE — 99396 PREV VISIT EST AGE 40-64: CPT

## 2021-02-04 PROCEDURE — 99072 ADDL SUPL MATRL&STAF TM PHE: CPT

## 2021-02-04 NOTE — HISTORY OF PRESENT ILLNESS
[TextBox_4] : pap 2017\par mammo 2020--already has referral\par colon 2019\par dexa 2020 [Currently Active] : currently active [Women] : women

## 2021-02-05 LAB — HPV HIGH+LOW RISK DNA PNL CVX: NOT DETECTED

## 2021-02-09 LAB — CYTOLOGY CVX/VAG DOC THIN PREP: NORMAL

## 2021-04-28 ENCOUNTER — RESULT REVIEW (OUTPATIENT)
Age: 51
End: 2021-04-28

## 2021-04-28 ENCOUNTER — APPOINTMENT (OUTPATIENT)
Dept: MAMMOGRAPHY | Facility: HOSPITAL | Age: 51
End: 2021-04-28
Payer: COMMERCIAL

## 2021-04-28 ENCOUNTER — APPOINTMENT (OUTPATIENT)
Dept: ULTRASOUND IMAGING | Facility: HOSPITAL | Age: 51
End: 2021-04-28
Payer: COMMERCIAL

## 2021-04-28 ENCOUNTER — OUTPATIENT (OUTPATIENT)
Dept: OUTPATIENT SERVICES | Facility: HOSPITAL | Age: 51
LOS: 1 days | End: 2021-04-28
Payer: COMMERCIAL

## 2021-04-28 DIAGNOSIS — Z98.890 OTHER SPECIFIED POSTPROCEDURAL STATES: Chronic | ICD-10-CM

## 2021-04-28 DIAGNOSIS — Z90.89 ACQUIRED ABSENCE OF OTHER ORGANS: Chronic | ICD-10-CM

## 2021-04-28 DIAGNOSIS — R92.8 OTHER ABNORMAL AND INCONCLUSIVE FINDINGS ON DIAGNOSTIC IMAGING OF BREAST: ICD-10-CM

## 2021-04-28 DIAGNOSIS — E89.0 POSTPROCEDURAL HYPOTHYROIDISM: Chronic | ICD-10-CM

## 2021-04-28 DIAGNOSIS — Z12.39 ENCOUNTER FOR OTHER SCREENING FOR MALIGNANT NEOPLASM OF BREAST: ICD-10-CM

## 2021-04-28 PROCEDURE — 77063 BREAST TOMOSYNTHESIS BI: CPT | Mod: 26

## 2021-04-28 PROCEDURE — 77063 BREAST TOMOSYNTHESIS BI: CPT

## 2021-04-28 PROCEDURE — 77067 SCR MAMMO BI INCL CAD: CPT | Mod: 26

## 2021-04-28 PROCEDURE — 76641 ULTRASOUND BREAST COMPLETE: CPT | Mod: 26,50

## 2021-04-28 PROCEDURE — 77067 SCR MAMMO BI INCL CAD: CPT

## 2021-04-28 PROCEDURE — 76641 ULTRASOUND BREAST COMPLETE: CPT

## 2021-09-02 ENCOUNTER — TRANSCRIPTION ENCOUNTER (OUTPATIENT)
Age: 51
End: 2021-09-02

## 2021-09-21 ENCOUNTER — APPOINTMENT (OUTPATIENT)
Dept: SURGERY | Facility: CLINIC | Age: 51
End: 2021-09-21
Payer: COMMERCIAL

## 2021-09-21 PROCEDURE — 99213 OFFICE O/P EST LOW 20 MIN: CPT

## 2021-09-21 NOTE — PHYSICAL EXAM
[Normocephalic] : normocephalic [EOMI] : extra ocular movement intact [Sclera nonicteric] : sclera nonicteric [Supple] : supple [No Supraclavicular Adenopathy] : no supraclavicular adenopathy [No Cervical Adenopathy] : no cervical adenopathy [Examined in the supine and seated position] : examined in the supine and seated position [Symmetrical] : symmetrical [No dominant masses] : no dominant masses in right breast  [No dominant masses] : no dominant masses left breast [No Nipple Retraction] : no left nipple retraction [No Nipple Discharge] : no left nipple discharge [No Axillary Lymphadenopathy] : no left axillary lymphadenopathy [Soft] : abdomen soft [No Swelling] : no swelling [Full ROM] : full range of motion [No Rashes] : no rashes [de-identified] : healed cervical incision, no masses

## 2022-01-27 ENCOUNTER — APPOINTMENT (OUTPATIENT)
Dept: ORTHOPEDIC SURGERY | Facility: CLINIC | Age: 52
End: 2022-01-27
Payer: COMMERCIAL

## 2022-01-27 VITALS — WEIGHT: 145 LBS | BODY MASS INDEX: 21.98 KG/M2 | HEIGHT: 68 IN

## 2022-01-27 DIAGNOSIS — M75.42 IMPINGEMENT SYNDROME OF LEFT SHOULDER: ICD-10-CM

## 2022-01-27 DIAGNOSIS — M50.30 OTHER CERVICAL DISC DEGENERATION, UNSPECIFIED CERVICAL REGION: ICD-10-CM

## 2022-01-27 DIAGNOSIS — M54.12 RADICULOPATHY, CERVICAL REGION: ICD-10-CM

## 2022-01-27 PROCEDURE — 73030 X-RAY EXAM OF SHOULDER: CPT | Mod: LT

## 2022-01-27 PROCEDURE — 99203 OFFICE O/P NEW LOW 30 MIN: CPT

## 2022-01-27 PROCEDURE — 72050 X-RAY EXAM NECK SPINE 4/5VWS: CPT

## 2022-01-28 NOTE — PHYSICAL EXAM
[de-identified] : Cervical Spine/Neck\par Inspection/Palpation :\par ¦ Inspection : alignment midline, normal degree of lordosis present\par ¦ Skin : normal appearance, no masses, no tenderness, trachea midline\par ¦ Palpation : musculature is nontender to palpation\par ¦ Tests and Signs : Spurling’s (-), Lhermitte’s (-) Maura’s Reflex (-) \par ¦ Range of Motion : arc of motion full in all planes, no crepitus or pain with ROM\par ¦ Stability : no subluxations or other evidence of instability demonstrated during range of motion testing\par o Muscle Strength : paraspinal muscle strength within normal limits\par o Muscle Tone : paraspinal muscle tone within normal limits\par o Muscle Bulk : normal, no atrophy\par o Cervical Lymph Nodes : no lymphadenopathy present\par \par Right Upper Extremity\par o Shoulder :\par ¦ Inspection/Palpation :  no tenderness over the greater tuberosity, no acromioclavicular joint tenderness, no tenderness anterior and posterior glenohumeral joint,no swelling, no deformities\par ¦ Range of Motion : ACTIVE FORWARD ELEVATION: Measured at 150 degrees, ACTIVE EXTERNAL ROTATION: Measured at 90 degrees, ACTIVE INTERNAL ROTATION: Measured at T6\par ¦ Strength : external rotation 5/5, internal rotation 5/5, supraspinatus 5/5\par ¦ Stability : no joint instability on provocative testing\par ¦ Tests/Signs : Neer (-), Saba (-)\par o Upper Arm : no tenderness, no swelling, no deformities\par o Muscle Bulk : no atrophy\par o Sensation : sensation intact to light touch\par o Skin : no skin rash or discoloration\par o Vascular Exam : no edema, no cyanosis, radial and ulnar pulses normal \par \par Left Upper Extremity\par o Shoulder :\par ¦ Inspection/Palpation :  tenderness over the greater tuberosity, no acromioclavicular joint tenderness, no tenderness anterior and posterior glenohumeral joint,no swelling, no deformities\par ¦ Range of Motion : ACTIVE FORWARD ELEVATION: Measured at 150 degrees, ACTIVE EXTERNAL ROTATION: Measured at 90 degrees, ACTIVE INTERNAL ROTATION: Measured at T6\par ¦ Strength : external rotation 5/5, internal rotation 5/5, supraspinatus 5/5\par ¦ Stability : no joint instability on provocative testing\par ¦ Tests/Signs : Neer (+), Saba (+)\par o Upper Arm : no tenderness, no swelling, no deformities\par o Muscle Bulk : no atrophy\par o Sensation : sensation intact to light touch\par o Skin : no skin rash or discoloration\par o Vascular Exam : no edema, no cyanosis, radial and ulnar pulses normal [de-identified] : o Left  Shoulder : Grashey, Axillary and Outlet views were obtained, there are no soft tissue abnormalities, no fractures, alignment is normal, normal appearing joint spaces, normal bone density, no bony lesions.\par \par  o Cervical Spine : AP, lateral, and oblique views were obtained, there are no soft tissue abnormalities, no fractures, alignment is normal, reversal of normal cervical lordosis, diffuse degenerative changes, most pronounced C4/C5, C5/C6, C4/C5, C5/C6 L>R foraminal stenosis, normal bone density, no bony lesions.\par \par

## 2022-01-28 NOTE — DISCUSSION/SUMMARY
[de-identified] : The underlying pathophysiology was reviewed in great detail with the patient as well as the various treatment options, including ice, analgesics, NSAIDs, Physical therapy, steroid injections.\par \par Activity modifications and restrictions were discussed. I advised avoiding overhead lifting. I advised the patient to work on good posture.\par \par A home exercise sheet was given and discussed with the patient to follow. \par \par A prescription for Physical Therapy was provided.\par \par FU 6 weeks \par \par All questions were answered, all alternatives discussed and the patient is in complete agreement with that plan. Follow-up appointment as instructed. Any issues and the patient will call or come in sooner.

## 2022-01-28 NOTE — HISTORY OF PRESENT ILLNESS
[de-identified] : JUAN ANTONIO ABRAHAM is a 52 year old RHD female presenting to the office complaining of left shoulder and neck pain.  Patient denies injury or trauma to the area. The patient describes the pain as a dull aching, and occasionally sharp pain localized to the anterior aspect of her left shoulder and posterior aspect of the left shoulder that is intermittent in nature The pain radiates down the lateral aspect of her upper arm . Her  symptoms are worsened by lifting, repetitive use/activity, lying on the affected side, and overhead activities. She notes did did have neck pain with rotation but this has decreased since taking Meloxicam. Patient reports the pain is not waking her up at night.  Patient reports associated weakness. Denies numbness and tingling in the upper extremity. \par Patient is taking Meloxicam for pain relief with moderate relief in symptoms.  \par

## 2022-04-02 ENCOUNTER — TRANSCRIPTION ENCOUNTER (OUTPATIENT)
Age: 52
End: 2022-04-02

## 2022-05-07 PROBLEM — Z86.018 HISTORY OF DERMATOFIBROMA: Status: RESOLVED | Noted: 2018-08-29 | Resolved: 2022-05-07

## 2022-05-10 ENCOUNTER — APPOINTMENT (OUTPATIENT)
Dept: OBGYN | Facility: CLINIC | Age: 52
End: 2022-05-10
Payer: COMMERCIAL

## 2022-05-10 VITALS
SYSTOLIC BLOOD PRESSURE: 99 MMHG | HEIGHT: 68 IN | WEIGHT: 150 LBS | DIASTOLIC BLOOD PRESSURE: 66 MMHG | HEART RATE: 77 BPM | BODY MASS INDEX: 22.73 KG/M2

## 2022-05-10 DIAGNOSIS — N63.10 UNSPECIFIED LUMP IN THE RIGHT BREAST, UNSPECIFIED QUADRANT: ICD-10-CM

## 2022-05-10 DIAGNOSIS — Z87.42 PERSONAL HISTORY OF OTHER DISEASES OF THE FEMALE GENITAL TRACT: ICD-10-CM

## 2022-05-10 DIAGNOSIS — N63.20 UNSPECIFIED LUMP IN THE RIGHT BREAST, UNSPECIFIED QUADRANT: ICD-10-CM

## 2022-05-10 DIAGNOSIS — Z86.018 PERSONAL HISTORY OF OTHER BENIGN NEOPLASM: ICD-10-CM

## 2022-05-10 DIAGNOSIS — H00.13 CHALAZION RIGHT EYE, UNSPECIFIED EYELID: ICD-10-CM

## 2022-05-10 DIAGNOSIS — Z12.39 ENCOUNTER FOR OTHER SCREENING FOR MALIGNANT NEOPLASM OF BREAST: ICD-10-CM

## 2022-05-10 DIAGNOSIS — Z01.419 ENCOUNTER FOR GYNECOLOGICAL EXAMINATION (GENERAL) (ROUTINE) W/OUT ABNORMAL FINDINGS: ICD-10-CM

## 2022-05-10 DIAGNOSIS — N64.4 MASTODYNIA: ICD-10-CM

## 2022-05-10 PROCEDURE — 99396 PREV VISIT EST AGE 40-64: CPT

## 2022-05-10 NOTE — HISTORY OF PRESENT ILLNESS
[Currently Active] : currently active [Women] : women [TextBox_4] : pap 2021\par mammo 2021\par colon 2019\par dexa 2020

## 2022-09-29 ENCOUNTER — RESULT REVIEW (OUTPATIENT)
Age: 52
End: 2022-09-29

## 2022-09-29 ENCOUNTER — APPOINTMENT (OUTPATIENT)
Dept: MAMMOGRAPHY | Facility: CLINIC | Age: 52
End: 2022-09-29

## 2022-09-29 ENCOUNTER — OUTPATIENT (OUTPATIENT)
Dept: OUTPATIENT SERVICES | Facility: HOSPITAL | Age: 52
LOS: 1 days | End: 2022-09-29
Payer: COMMERCIAL

## 2022-09-29 ENCOUNTER — APPOINTMENT (OUTPATIENT)
Dept: ULTRASOUND IMAGING | Facility: CLINIC | Age: 52
End: 2022-09-29

## 2022-09-29 DIAGNOSIS — Z12.39 ENCOUNTER FOR OTHER SCREENING FOR MALIGNANT NEOPLASM OF BREAST: ICD-10-CM

## 2022-09-29 DIAGNOSIS — Z98.890 OTHER SPECIFIED POSTPROCEDURAL STATES: Chronic | ICD-10-CM

## 2022-09-29 DIAGNOSIS — R92.2 INCONCLUSIVE MAMMOGRAM: ICD-10-CM

## 2022-09-29 DIAGNOSIS — E89.0 POSTPROCEDURAL HYPOTHYROIDISM: Chronic | ICD-10-CM

## 2022-09-29 DIAGNOSIS — Z90.89 ACQUIRED ABSENCE OF OTHER ORGANS: Chronic | ICD-10-CM

## 2022-09-29 PROCEDURE — 76641 ULTRASOUND BREAST COMPLETE: CPT

## 2022-09-29 PROCEDURE — 77067 SCR MAMMO BI INCL CAD: CPT | Mod: 26

## 2022-09-29 PROCEDURE — 77063 BREAST TOMOSYNTHESIS BI: CPT | Mod: 26

## 2022-09-29 PROCEDURE — 77063 BREAST TOMOSYNTHESIS BI: CPT

## 2022-09-29 PROCEDURE — 77067 SCR MAMMO BI INCL CAD: CPT

## 2022-09-29 PROCEDURE — 76641 ULTRASOUND BREAST COMPLETE: CPT | Mod: 26,50

## 2022-12-01 ENCOUNTER — APPOINTMENT (OUTPATIENT)
Dept: MRI IMAGING | Facility: CLINIC | Age: 52
End: 2022-12-01

## 2023-01-10 ENCOUNTER — APPOINTMENT (OUTPATIENT)
Dept: ORTHOPEDIC SURGERY | Facility: CLINIC | Age: 53
End: 2023-01-10

## 2023-01-13 ENCOUNTER — NON-APPOINTMENT (OUTPATIENT)
Age: 53
End: 2023-01-13

## 2023-01-20 ENCOUNTER — APPOINTMENT (OUTPATIENT)
Dept: ORTHOPEDIC SURGERY | Facility: CLINIC | Age: 53
End: 2023-01-20
Payer: COMMERCIAL

## 2023-01-20 VITALS — WEIGHT: 148 LBS | HEIGHT: 68 IN | BODY MASS INDEX: 22.43 KG/M2

## 2023-01-20 PROCEDURE — 73080 X-RAY EXAM OF ELBOW: CPT | Mod: LT

## 2023-01-20 PROCEDURE — 99214 OFFICE O/P EST MOD 30 MIN: CPT

## 2023-03-30 ENCOUNTER — APPOINTMENT (OUTPATIENT)
Dept: ULTRASOUND IMAGING | Facility: CLINIC | Age: 53
End: 2023-03-30

## 2023-03-30 ENCOUNTER — APPOINTMENT (OUTPATIENT)
Dept: ORTHOPEDIC SURGERY | Facility: CLINIC | Age: 53
End: 2023-03-30
Payer: COMMERCIAL

## 2023-03-30 VITALS — WEIGHT: 147 LBS | BODY MASS INDEX: 22.28 KG/M2 | HEIGHT: 68 IN

## 2023-03-30 DIAGNOSIS — M25.522 PAIN IN LEFT ELBOW: ICD-10-CM

## 2023-03-30 PROCEDURE — 99214 OFFICE O/P EST MOD 30 MIN: CPT

## 2023-04-04 ENCOUNTER — RESULT REVIEW (OUTPATIENT)
Age: 53
End: 2023-04-04

## 2023-04-04 ENCOUNTER — OUTPATIENT (OUTPATIENT)
Dept: OUTPATIENT SERVICES | Facility: HOSPITAL | Age: 53
LOS: 1 days | End: 2023-04-04
Payer: COMMERCIAL

## 2023-04-04 ENCOUNTER — APPOINTMENT (OUTPATIENT)
Dept: ULTRASOUND IMAGING | Facility: HOSPITAL | Age: 53
End: 2023-04-04
Payer: COMMERCIAL

## 2023-04-04 DIAGNOSIS — R92.8 OTHER ABNORMAL AND INCONCLUSIVE FINDINGS ON DIAGNOSTIC IMAGING OF BREAST: ICD-10-CM

## 2023-04-04 DIAGNOSIS — Z98.890 OTHER SPECIFIED POSTPROCEDURAL STATES: Chronic | ICD-10-CM

## 2023-04-04 DIAGNOSIS — E89.0 POSTPROCEDURAL HYPOTHYROIDISM: Chronic | ICD-10-CM

## 2023-04-04 DIAGNOSIS — Z90.89 ACQUIRED ABSENCE OF OTHER ORGANS: Chronic | ICD-10-CM

## 2023-04-04 DIAGNOSIS — Z00.8 ENCOUNTER FOR OTHER GENERAL EXAMINATION: ICD-10-CM

## 2023-04-04 PROCEDURE — 76642 ULTRASOUND BREAST LIMITED: CPT

## 2023-04-04 PROCEDURE — 76642 ULTRASOUND BREAST LIMITED: CPT | Mod: 26,LT

## 2023-04-05 ENCOUNTER — NON-APPOINTMENT (OUTPATIENT)
Age: 53
End: 2023-04-05

## 2023-04-24 ENCOUNTER — APPOINTMENT (OUTPATIENT)
Dept: OBGYN | Facility: CLINIC | Age: 53
End: 2023-04-24
Payer: COMMERCIAL

## 2023-04-24 VITALS
WEIGHT: 147 LBS | HEART RATE: 90 BPM | BODY MASS INDEX: 22.28 KG/M2 | OXYGEN SATURATION: 98 % | TEMPERATURE: 97.9 F | HEIGHT: 68 IN

## 2023-04-24 DIAGNOSIS — R35.0 FREQUENCY OF MICTURITION: ICD-10-CM

## 2023-04-24 DIAGNOSIS — Z15.09 GENETIC SUSCEPTIBILITY TO OTHER MALIGNANT NEOPLASM: ICD-10-CM

## 2023-04-24 DIAGNOSIS — N89.8 OTHER SPECIFIED NONINFLAMMATORY DISORDERS OF VAGINA: ICD-10-CM

## 2023-04-24 DIAGNOSIS — Z87.898 PERSONAL HISTORY OF OTHER SPECIFIED CONDITIONS: ICD-10-CM

## 2023-04-24 DIAGNOSIS — R10.2 PELVIC AND PERINEAL PAIN: ICD-10-CM

## 2023-04-24 LAB
BILIRUB UR QL STRIP: NEGATIVE
CLARITY UR: CLEAR
COLLECTION METHOD: NORMAL
GLUCOSE UR-MCNC: NEGATIVE
HCG UR QL: 0.2 EU/DL
HCG UR QL: NEGATIVE
HGB UR QL STRIP.AUTO: NORMAL
KETONES UR-MCNC: NORMAL
LEUKOCYTE ESTERASE UR QL STRIP: NEGATIVE
NITRITE UR QL STRIP: NEGATIVE
PH UR STRIP: 5.5
PROT UR STRIP-MCNC: NORMAL
QUALITY CONTROL: YES
SP GR UR STRIP: >=1.03

## 2023-04-24 PROCEDURE — 81003 URINALYSIS AUTO W/O SCOPE: CPT | Mod: QW

## 2023-04-24 PROCEDURE — 36415 COLL VENOUS BLD VENIPUNCTURE: CPT

## 2023-04-24 PROCEDURE — 99214 OFFICE O/P EST MOD 30 MIN: CPT

## 2023-04-24 NOTE — REASON FOR VISIT
[Acute] : an acute visit for [Pelvic Pain] : pelvic pain [Vulvar/Vaginal Complaint] : vulvar/vaginal complaint

## 2023-04-25 PROBLEM — Z15.09 GENETIC SUSCEPTIBILITY TO CANCER: Status: ACTIVE | Noted: 2023-04-25

## 2023-04-25 LAB
APPEARANCE: CLEAR
BACTERIA: NEGATIVE /HPF
BILIRUBIN URINE: NEGATIVE
BLOOD URINE: ABNORMAL
CAST: 2 /LPF
COLOR: YELLOW
EPITHELIAL CELLS: 2 /HPF
GLUCOSE QUALITATIVE U: NEGATIVE MG/DL
KETONES URINE: ABNORMAL MG/DL
LEUKOCYTE ESTERASE URINE: NEGATIVE
MICROSCOPIC-UA: NORMAL
NITRITE URINE: NEGATIVE
PH URINE: 5.5
PROTEIN URINE: NORMAL MG/DL
RED BLOOD CELLS URINE: 5 /HPF
SPECIFIC GRAVITY URINE: 1.03
UROBILINOGEN URINE: 0.2 MG/DL
WHITE BLOOD CELLS URINE: 0 /HPF

## 2023-04-25 NOTE — HISTORY OF PRESENT ILLNESS
[Perineum] : perineum [Regular Cycle Intervals] : periods have been regular [Frequency: Q ___ days] : menstrual periods occur approximately every [unfilled] days [Menarche Age: ____] : age at menarche was [unfilled] [No] : Patient does not have concerns regarding sex [Currently Active] : currently active [Women] : women [FreeTextEntry1] : 54 yo  LMP 3 weeks ago. Patient presents for vaginal discharge clear, burning, itchy.   [Patient reported PAP Smear was normal] : Patient reported PAP Smear was normal [N] : Patient denies prior pregnancies [Mammogramdate] : 09/22 [BreastSonogramDate] : 03/23 [PapSmeardate] : 02/21 [BoneDensityDate] : 02/20 [ColonoscopyDate] : 2019 [TextBox_43] : IBS

## 2023-04-25 NOTE — PHYSICAL EXAM
[Chaperone Declined] : Patient declined chaperone [Appropriately responsive] : appropriately responsive [Alert] : alert [No Acute Distress] : no acute distress [Oriented x3] : oriented x3 [Labia Majora] : normal [Labia Minora] : normal [Discharge] : a  ~M vaginal discharge was present [Scant] : scant [White] : white [No Bleeding] : There was no active vaginal bleeding [Normal] : normal [Uterine Adnexae] : normal

## 2023-04-26 LAB
BACTERIA UR CULT: NORMAL
CANDIDA VAG CYTO: NOT DETECTED
G VAGINALIS+PREV SP MTYP VAG QL MICRO: NOT DETECTED
T VAGINALIS VAG QL WET PREP: NOT DETECTED

## 2023-06-01 ENCOUNTER — APPOINTMENT (OUTPATIENT)
Dept: OBGYN | Facility: CLINIC | Age: 53
End: 2023-06-01

## 2023-07-22 ENCOUNTER — NON-APPOINTMENT (OUTPATIENT)
Age: 53
End: 2023-07-22

## 2023-09-07 ENCOUNTER — NON-APPOINTMENT (OUTPATIENT)
Age: 53
End: 2023-09-07

## 2023-11-12 ENCOUNTER — NON-APPOINTMENT (OUTPATIENT)
Age: 53
End: 2023-11-12

## 2023-12-29 ENCOUNTER — NON-APPOINTMENT (OUTPATIENT)
Age: 53
End: 2023-12-29

## 2024-08-28 ENCOUNTER — TRANSCRIPTION ENCOUNTER (OUTPATIENT)
Age: 54
End: 2024-08-28

## 2024-08-28 ENCOUNTER — APPOINTMENT (OUTPATIENT)
Dept: ORTHOPEDIC SURGERY | Facility: CLINIC | Age: 54
End: 2024-08-28
Payer: COMMERCIAL

## 2024-08-28 DIAGNOSIS — M54.9 DORSALGIA, UNSPECIFIED: ICD-10-CM

## 2024-08-28 PROCEDURE — 99214 OFFICE O/P EST MOD 30 MIN: CPT

## 2024-08-28 PROCEDURE — 72110 X-RAY EXAM L-2 SPINE 4/>VWS: CPT

## 2024-08-28 RX ORDER — DICLOFENAC SODIUM 75 MG/1
75 TABLET, DELAYED RELEASE ORAL
Qty: 60 | Refills: 0 | Status: ACTIVE | COMMUNITY
Start: 2024-08-28 | End: 1900-01-01

## 2024-08-28 RX ORDER — TIZANIDINE 2 MG/1
2 TABLET ORAL
Qty: 24 | Refills: 0 | Status: ACTIVE | COMMUNITY
Start: 2024-08-28 | End: 1900-01-01

## 2024-08-28 NOTE — HISTORY OF PRESENT ILLNESS
[de-identified] : Patient is a 54-year-old female who presents for initial eval of lower back, RT buttock pain. Reports h/o sciatica. Denies LE radicular pain from the knee to her feet.  Reports episodes of tightness in LE's when walking. Able to walk 5 blocks. Exacerbation of sxs with prolonged walks. Reports pain in shin to the bottom of her foot on the LT, with walking. Not taking pain meds.  Patient also states her RT bicep hurts with heavy lifting.

## 2024-08-28 NOTE — ADDENDUM
[FreeTextEntry1] :  I, Cassandra Jimenez, acted solely as a scribe for Dr. Brice Hunter MD on this date 08/28/2024   All medical record entries made by the Scribe were at my, Dr. Brice Hunter MD., direction and personally dictated by me on 08/28/2024. I have reviewed the chart and agree that the record accurately reflects my personal performance of the history, physical exam, assessment and plan. I have also personally directed, reviewed, and agreed with the chart.

## 2024-08-28 NOTE — PHYSICAL EXAM
[de-identified] : Lumbar Physical Exam   Gait - Normal   Station - Normal   Sagittal balance - Normal   Compensatory mechanism? - None   Heel walk - Normal   Toe walk - Normal     Reflexes    Patellar - normal    Gastroc - normal    Clonus - No    Hip Exam - Normal   Straight leg raise - none   Pulses - 2+ dp/pt   Range of motion - normal    Sensation   Sensation intact to light touch in L1, L2, L3, L4, L5 and S1 dermatomes bilaterally     Motor             IP Quad HS TA Gastroc EHL   Right 5/5  5/5   5/5 5/5    5/5     5/5   Left   5/5 5/5 5/5 5/5    5/5      5/5  [de-identified] : Lumbar Rads 4 views:  moderate facet arthropathy  Transitional lumbosacral anatomy  L5-S1 retrolisthesis   Lumbralized S1 vertebrae

## 2024-08-28 NOTE — ASSESSMENT
[FreeTextEntry1] : I had a lengthy discussion with the patient in regard to treatment plan and diagnosis. There are no red flag findings on imaging nor are there any red flag findings on clinical exams. Therefore, we will proceed with a course of conservative treatment. This would include physical therapy/home exercise program, Diclofenac, Tizanidine, Tylenol, NSAIDs as medically indicated. The patient will follow up with me in approximately 4 to 6 weeks. I encouraged the patient to follow-up sooner if there are any new or worsening symptoms.

## 2024-09-04 ENCOUNTER — APPOINTMENT (OUTPATIENT)
Dept: OBGYN | Facility: CLINIC | Age: 54
End: 2024-09-04
Payer: COMMERCIAL

## 2024-09-04 VITALS
DIASTOLIC BLOOD PRESSURE: 80 MMHG | WEIGHT: 148 LBS | OXYGEN SATURATION: 98 % | HEART RATE: 67 BPM | TEMPERATURE: 97.9 F | SYSTOLIC BLOOD PRESSURE: 112 MMHG | HEIGHT: 68 IN | BODY MASS INDEX: 22.43 KG/M2

## 2024-09-04 DIAGNOSIS — Z12.4 ENCOUNTER FOR SCREENING FOR MALIGNANT NEOPLASM OF CERVIX: ICD-10-CM

## 2024-09-04 DIAGNOSIS — R10.2 PELVIC AND PERINEAL PAIN: ICD-10-CM

## 2024-09-04 DIAGNOSIS — N95.1 MENOPAUSAL AND FEMALE CLIMACTERIC STATES: ICD-10-CM

## 2024-09-04 DIAGNOSIS — Z78.9 OTHER SPECIFIED HEALTH STATUS: ICD-10-CM

## 2024-09-04 DIAGNOSIS — M85.89 OTHER SPECIFIED DISORDERS OF BONE DENSITY AND STRUCTURE, MULTIPLE SITES: ICD-10-CM

## 2024-09-04 LAB
BILIRUB UR QL STRIP: NEGATIVE
COLLECTION METHOD: NORMAL
GLUCOSE UR-MCNC: NEGATIVE
HCG UR QL: 0.2 EU/DL
HGB UR QL STRIP.AUTO: NORMAL
KETONES UR-MCNC: NORMAL
LEUKOCYTE ESTERASE UR QL STRIP: NEGATIVE
NITRITE UR QL STRIP: NEGATIVE
PH UR STRIP: 5.5
PROT UR STRIP-MCNC: NORMAL
SP GR UR STRIP: 1.02

## 2024-09-04 PROCEDURE — 81003 URINALYSIS AUTO W/O SCOPE: CPT | Mod: QW

## 2024-09-04 PROCEDURE — 99396 PREV VISIT EST AGE 40-64: CPT

## 2024-09-04 NOTE — HISTORY OF PRESENT ILLNESS
[perimenopausal] : perimenopausal [N] : Patient denies prior pregnancies [FreeTextEntry1] : 55 yo  LMP  presents for annual c/o vaginal discharge and discomfort [ColonoscopyDate] : 2020 [TextBox_43] : polyp

## 2024-09-04 NOTE — PHYSICAL EXAM

## 2024-09-05 LAB
APPEARANCE: CLEAR
BACTERIA: ABNORMAL /HPF
BILIRUBIN URINE: NEGATIVE
BLOOD URINE: ABNORMAL
CAST: 3 /LPF
COLOR: YELLOW
EPITHELIAL CELLS: 9 /HPF
GLUCOSE QUALITATIVE U: NEGATIVE MG/DL
KETONES URINE: NEGATIVE MG/DL
LEUKOCYTE ESTERASE URINE: NEGATIVE
MICROSCOPIC-UA: NORMAL
NITRITE URINE: NEGATIVE
PH URINE: 5.5
PROTEIN URINE: NORMAL MG/DL
RED BLOOD CELLS URINE: 10 /HPF
SPECIFIC GRAVITY URINE: 1.03
UROBILINOGEN URINE: 0.2 MG/DL
WHITE BLOOD CELLS URINE: 0 /HPF

## 2024-09-10 NOTE — ASSESSMENT
[FreeTextEntry1] : Patient with history of left breast pain, with no suspicious findings on physical examination her prior imaging. probable benign findings on prior imaging, f/u afsaneh br US 10/2021 if zhanna aguiar continue under care of Dr Ambrocio with yearly imaging.   Vaccine status unknown

## 2024-09-15 LAB
BACTERIA UR CULT: NORMAL
CANDIDA VAG CYTO: NOT DETECTED
CYTOLOGY CVX/VAG DOC THIN PREP: NORMAL
G VAGINALIS+PREV SP MTYP VAG QL MICRO: NOT DETECTED
HPV 16 E6+E7 MRNA CVX QL NAA+PROBE: NOT DETECTED
HPV HIGH+LOW RISK DNA PNL CVX: NOT DETECTED
HPV18+45 E6+E7 MRNA CVX QL NAA+PROBE: NOT DETECTED
T VAGINALIS VAG QL WET PREP: NOT DETECTED

## 2024-09-25 ENCOUNTER — RESULT REVIEW (OUTPATIENT)
Age: 54
End: 2024-09-25

## 2024-09-25 ENCOUNTER — APPOINTMENT (OUTPATIENT)
Dept: ORTHOPEDIC SURGERY | Facility: CLINIC | Age: 54
End: 2024-09-25

## 2024-09-25 ENCOUNTER — APPOINTMENT (OUTPATIENT)
Dept: ULTRASOUND IMAGING | Facility: HOSPITAL | Age: 54
End: 2024-09-25
Payer: COMMERCIAL

## 2024-09-25 ENCOUNTER — APPOINTMENT (OUTPATIENT)
Dept: MAMMOGRAPHY | Facility: HOSPITAL | Age: 54
End: 2024-09-25
Payer: COMMERCIAL

## 2024-09-25 ENCOUNTER — OUTPATIENT (OUTPATIENT)
Dept: OUTPATIENT SERVICES | Facility: HOSPITAL | Age: 54
LOS: 1 days | End: 2024-09-25
Payer: COMMERCIAL

## 2024-09-25 DIAGNOSIS — Z90.89 ACQUIRED ABSENCE OF OTHER ORGANS: Chronic | ICD-10-CM

## 2024-09-25 DIAGNOSIS — Z12.39 ENCOUNTER FOR OTHER SCREENING FOR MALIGNANT NEOPLASM OF BREAST: ICD-10-CM

## 2024-09-25 DIAGNOSIS — Z98.890 OTHER SPECIFIED POSTPROCEDURAL STATES: Chronic | ICD-10-CM

## 2024-09-25 DIAGNOSIS — R92.30 DENSE BREASTS, UNSPECIFIED: ICD-10-CM

## 2024-09-25 DIAGNOSIS — E89.0 POSTPROCEDURAL HYPOTHYROIDISM: Chronic | ICD-10-CM

## 2024-09-25 PROCEDURE — 77067 SCR MAMMO BI INCL CAD: CPT | Mod: 26

## 2024-09-25 PROCEDURE — 77063 BREAST TOMOSYNTHESIS BI: CPT

## 2024-09-25 PROCEDURE — 77067 SCR MAMMO BI INCL CAD: CPT

## 2024-09-25 PROCEDURE — 76641 ULTRASOUND BREAST COMPLETE: CPT

## 2024-09-25 PROCEDURE — 77063 BREAST TOMOSYNTHESIS BI: CPT | Mod: 26

## 2024-09-25 PROCEDURE — 76641 ULTRASOUND BREAST COMPLETE: CPT | Mod: 26,50

## 2024-09-27 ENCOUNTER — TRANSCRIPTION ENCOUNTER (OUTPATIENT)
Age: 54
End: 2024-09-27

## 2024-10-21 ENCOUNTER — APPOINTMENT (OUTPATIENT)
Dept: RADIOLOGY | Facility: HOSPITAL | Age: 54
End: 2024-10-21
Payer: COMMERCIAL

## 2024-10-21 ENCOUNTER — OUTPATIENT (OUTPATIENT)
Dept: OUTPATIENT SERVICES | Facility: HOSPITAL | Age: 54
LOS: 1 days | End: 2024-10-21
Payer: COMMERCIAL

## 2024-10-21 ENCOUNTER — APPOINTMENT (OUTPATIENT)
Dept: ULTRASOUND IMAGING | Facility: HOSPITAL | Age: 54
End: 2024-10-21
Payer: COMMERCIAL

## 2024-10-21 DIAGNOSIS — R10.2 PELVIC AND PERINEAL PAIN: ICD-10-CM

## 2024-10-21 DIAGNOSIS — Z98.890 OTHER SPECIFIED POSTPROCEDURAL STATES: Chronic | ICD-10-CM

## 2024-10-21 DIAGNOSIS — E89.0 POSTPROCEDURAL HYPOTHYROIDISM: Chronic | ICD-10-CM

## 2024-10-21 DIAGNOSIS — Z90.89 ACQUIRED ABSENCE OF OTHER ORGANS: Chronic | ICD-10-CM

## 2024-10-21 DIAGNOSIS — N95.1 MENOPAUSAL AND FEMALE CLIMACTERIC STATES: ICD-10-CM

## 2024-10-21 DIAGNOSIS — M85.89 OTHER SPECIFIED DISORDERS OF BONE DENSITY AND STRUCTURE, MULTIPLE SITES: ICD-10-CM

## 2024-10-21 PROCEDURE — 76856 US EXAM PELVIC COMPLETE: CPT | Mod: 26

## 2024-10-21 PROCEDURE — 77080 DXA BONE DENSITY AXIAL: CPT | Mod: 26

## 2024-10-21 PROCEDURE — 76830 TRANSVAGINAL US NON-OB: CPT

## 2024-10-21 PROCEDURE — 76830 TRANSVAGINAL US NON-OB: CPT | Mod: 26

## 2024-10-21 PROCEDURE — 76856 US EXAM PELVIC COMPLETE: CPT

## 2024-10-21 PROCEDURE — 77080 DXA BONE DENSITY AXIAL: CPT

## 2024-11-01 ENCOUNTER — APPOINTMENT (OUTPATIENT)
Dept: ULTRASOUND IMAGING | Facility: CLINIC | Age: 54
End: 2024-11-01
Payer: COMMERCIAL

## 2024-11-01 ENCOUNTER — RESULT REVIEW (OUTPATIENT)
Age: 54
End: 2024-11-01

## 2024-11-01 ENCOUNTER — OUTPATIENT (OUTPATIENT)
Dept: OUTPATIENT SERVICES | Facility: HOSPITAL | Age: 54
LOS: 1 days | End: 2024-11-01
Payer: COMMERCIAL

## 2024-11-01 DIAGNOSIS — E89.0 POSTPROCEDURAL HYPOTHYROIDISM: Chronic | ICD-10-CM

## 2024-11-01 DIAGNOSIS — R92.8 OTHER ABNORMAL AND INCONCLUSIVE FINDINGS ON DIAGNOSTIC IMAGING OF BREAST: ICD-10-CM

## 2024-11-01 DIAGNOSIS — Z98.890 OTHER SPECIFIED POSTPROCEDURAL STATES: Chronic | ICD-10-CM

## 2024-11-01 DIAGNOSIS — Z90.89 ACQUIRED ABSENCE OF OTHER ORGANS: Chronic | ICD-10-CM

## 2024-11-01 PROCEDURE — A4648: CPT

## 2024-11-01 PROCEDURE — 88305 TISSUE EXAM BY PATHOLOGIST: CPT

## 2024-11-01 PROCEDURE — 19083 BX BREAST 1ST LESION US IMAG: CPT | Mod: LT

## 2024-11-01 PROCEDURE — 88305 TISSUE EXAM BY PATHOLOGIST: CPT | Mod: 26

## 2024-11-01 PROCEDURE — 77065 DX MAMMO INCL CAD UNI: CPT

## 2024-11-01 PROCEDURE — 19083 BX BREAST 1ST LESION US IMAG: CPT

## 2024-11-01 PROCEDURE — 77065 DX MAMMO INCL CAD UNI: CPT | Mod: 26,LT

## 2024-12-01 ENCOUNTER — NON-APPOINTMENT (OUTPATIENT)
Age: 54
End: 2024-12-01

## 2025-01-17 ENCOUNTER — NON-APPOINTMENT (OUTPATIENT)
Age: 55
End: 2025-01-17

## 2025-05-14 ENCOUNTER — APPOINTMENT (OUTPATIENT)
Dept: OBGYN | Facility: CLINIC | Age: 55
End: 2025-05-14

## 2025-05-14 VITALS
DIASTOLIC BLOOD PRESSURE: 78 MMHG | OXYGEN SATURATION: 96 % | TEMPERATURE: 96.7 F | HEART RATE: 68 BPM | HEIGHT: 68 IN | BODY MASS INDEX: 22.28 KG/M2 | SYSTOLIC BLOOD PRESSURE: 116 MMHG | WEIGHT: 147 LBS

## 2025-05-14 DIAGNOSIS — N60.09 SOLITARY CYST OF UNSPECIFIED BREAST: ICD-10-CM

## 2025-05-14 DIAGNOSIS — D24.9 BENIGN NEOPLASM OF UNSPECIFIED BREAST: ICD-10-CM

## 2025-05-14 DIAGNOSIS — Z86.018 PERSONAL HISTORY OF OTHER BENIGN NEOPLASM: ICD-10-CM

## 2025-05-14 PROCEDURE — 99213 OFFICE O/P EST LOW 20 MIN: CPT

## 2025-05-23 PROBLEM — Z86.018 HISTORY OF FIBROADENOMA OF BREAST: Status: RESOLVED | Noted: 2025-05-14 | Resolved: 2025-05-14

## 2025-07-29 ENCOUNTER — OUTPATIENT (OUTPATIENT)
Dept: OUTPATIENT SERVICES | Facility: HOSPITAL | Age: 55
LOS: 1 days | End: 2025-07-29
Payer: MEDICAID

## 2025-07-29 ENCOUNTER — APPOINTMENT (OUTPATIENT)
Dept: MRI IMAGING | Facility: CLINIC | Age: 55
End: 2025-07-29
Payer: MEDICAID

## 2025-07-29 DIAGNOSIS — Z90.89 ACQUIRED ABSENCE OF OTHER ORGANS: Chronic | ICD-10-CM

## 2025-07-29 DIAGNOSIS — Z12.39 ENCOUNTER FOR OTHER SCREENING FOR MALIGNANT NEOPLASM OF BREAST: ICD-10-CM

## 2025-07-29 DIAGNOSIS — Z98.890 OTHER SPECIFIED POSTPROCEDURAL STATES: Chronic | ICD-10-CM

## 2025-07-29 DIAGNOSIS — R92.30 DENSE BREASTS, UNSPECIFIED: ICD-10-CM

## 2025-07-29 DIAGNOSIS — E89.0 POSTPROCEDURAL HYPOTHYROIDISM: Chronic | ICD-10-CM

## 2025-07-29 DIAGNOSIS — D24.9 BENIGN NEOPLASM OF UNSPECIFIED BREAST: ICD-10-CM

## 2025-07-29 PROCEDURE — C8937: CPT

## 2025-07-29 PROCEDURE — 77049 MRI BREAST C-+ W/CAD BI: CPT | Mod: 26

## 2025-07-29 PROCEDURE — C8908: CPT

## 2025-07-29 PROCEDURE — A9585: CPT

## 2025-07-31 ENCOUNTER — NON-APPOINTMENT (OUTPATIENT)
Age: 55
End: 2025-07-31